# Patient Record
Sex: FEMALE | Race: WHITE | Employment: PART TIME | URBAN - METROPOLITAN AREA
[De-identification: names, ages, dates, MRNs, and addresses within clinical notes are randomized per-mention and may not be internally consistent; named-entity substitution may affect disease eponyms.]

---

## 2017-01-17 ENCOUNTER — OFFICE VISIT (OUTPATIENT)
Dept: DERMATOLOGY | Facility: CLINIC | Age: 32
End: 2017-01-17
Payer: COMMERCIAL

## 2017-01-17 VITALS
WEIGHT: 191.14 LBS | HEIGHT: 64 IN | BODY MASS INDEX: 32.63 KG/M2 | RESPIRATION RATE: 20 BRPM | HEART RATE: 60 BPM | SYSTOLIC BLOOD PRESSURE: 100 MMHG | OXYGEN SATURATION: 98 % | DIASTOLIC BLOOD PRESSURE: 50 MMHG

## 2017-01-17 DIAGNOSIS — D48.5 NEOPLASM OF UNCERTAIN BEHAVIOR OF SKIN: Primary | ICD-10-CM

## 2017-01-17 DIAGNOSIS — D23.4 BENIGN NEOPLASM OF SCALP AND SKIN OF NECK: ICD-10-CM

## 2017-01-17 PROCEDURE — 11100 HC BIOPSY SKIN/SUBQ/MUC MEM, SINGLE LESION: CPT | Performed by: DERMATOLOGY

## 2017-01-17 PROCEDURE — 88305 TISSUE EXAM BY PATHOLOGIST: CPT | Performed by: DERMATOLOGY

## 2017-01-17 PROCEDURE — 99202 OFFICE O/P NEW SF 15 MIN: CPT | Mod: 25 | Performed by: DERMATOLOGY

## 2017-01-17 RX ORDER — LIDOCAINE HYDROCHLORIDE AND EPINEPHRINE 10; 10 MG/ML; UG/ML
3 INJECTION, SOLUTION INFILTRATION; PERINEURAL ONCE
Qty: 3 ML | Refills: 0 | OUTPATIENT
Start: 2017-01-17 | End: 2017-01-17

## 2017-01-17 NOTE — NURSING NOTE
"Chief Complaint   Patient presents with     Consult     Wart on Right temple and Mole on back        Initial /50 mmHg  Pulse 60  Resp 20  Ht 5' 3.98\" (1.625 m)  Wt 191 lb 2.2 oz (86.7 kg)  BMI 32.83 kg/m2  SpO2 98% Estimated body mass index is 32.83 kg/(m^2) as calculated from the following:    Height as of this encounter: 5' 3.98\" (1.625 m).    Weight as of this encounter: 191 lb 2.2 oz (86.7 kg).  BP completed using cuff size: yolanda Sousa MA      "
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
## 2017-01-20 PROBLEM — D23.4 BENIGN NEOPLASM OF SCALP AND SKIN OF NECK: Status: ACTIVE | Noted: 2017-01-20

## 2017-01-20 PROBLEM — D48.5 NEOPLASM OF UNCERTAIN BEHAVIOR OF SKIN: Status: ACTIVE | Noted: 2017-01-20

## 2017-02-01 LAB — COPATH REPORT: NORMAL

## 2017-02-03 ENCOUNTER — TELEPHONE (OUTPATIENT)
Dept: DERMATOLOGY | Facility: CLINIC | Age: 32
End: 2017-02-03

## 2017-02-03 DIAGNOSIS — D09.9 SQUAMOUS CELL CARCINOMA IN SITU: Primary | ICD-10-CM

## 2017-02-03 RX ORDER — IMIQUIMOD 12.5 MG/.25G
CREAM TOPICAL
Qty: 24 PACKET | Refills: 1 | Status: SHIPPED | OUTPATIENT
Start: 2017-02-03

## 2017-02-03 NOTE — TELEPHONE ENCOUNTER
Called with results of biopsy showing atypical squamous proliferation. Recommended 6 weeks of aldara 5 times per week. Will ask that she return in 2 months for recheck of the area. Patient agrees with plan.

## 2017-04-04 ENCOUNTER — OFFICE VISIT (OUTPATIENT)
Dept: DERMATOLOGY | Facility: CLINIC | Age: 32
End: 2017-04-04
Payer: COMMERCIAL

## 2017-04-04 DIAGNOSIS — D49.2 ATYPICAL SQUAMOPROLIFERATIVE SKIN LESION: Primary | ICD-10-CM

## 2017-04-04 PROCEDURE — 99213 OFFICE O/P EST LOW 20 MIN: CPT | Performed by: DERMATOLOGY
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
## 2017-04-04 NOTE — MR AVS SNAPSHOT
"              After Visit Summary   2017    Lizzy Conrad    MRN: [id number]           Patient Information     Date Of Birth          1985        Visit Information        Provider Department      2017 12:45 PM Marisabel Cesar MD [facility]        Today's Diagnoses     Atypical squamoproliferative skin lesion    -  1       Follow-ups after your visit        Who to contact     If you have questions or need follow up information about today's clinic visit or your schedule please contact [facility] directly at [phone].  Normal or non-critical lab and imaging results will be communicated to you by mohchihart, letter or phone within 4 business days after the clinic has received the results. If you do not hear from us within 7 days, please contact the clinic through mohchihart or phone. If you have a critical or abnormal lab result, we will notify you by phone as soon as possible.  Submit refill requests through [facility] or call your pharmacy and they will forward the refill request to us. Please allow 3 business days for your refill to be completed.          Additional Information About Your Visit        MyChart Information     [facility] lets you send messages to your doctor, view your test results, renew your prescriptions, schedule appointments and more. To sign up, go to [email or web address] . Click on \"Log in\" on the left side of the screen, which will take you to the Welcome page. Then click on \"Sign up Now\" on the right side of the page.     You will be asked to enter the access code listed below, as well as some personal information. Please follow the directions to create your username and password.     Your access code is: [email or web address]  Expires: 7/3/2017  1:26 PM     Your access code will  in 90 days. If you need help or a new code, please call your [facility] or [phone].        Care EveryWhere ID     This is your Care EveryWhere ID. This could be used by " other organizations to access your [facility] medical records  [id number]         Blood Pressure from Last 3 Encounters:   01/17/17 100/50   07/22/14 104/76   05/31/13 106/70    Weight from Last 3 Encounters:   01/17/17 191 lb 2.2 oz (86.7 kg)   07/22/14 175 lb (79.4 kg)   05/30/13 130 lb 1.1 oz (59 kg)              Today, you had the following     No orders found for display       Primary Care Provider Office Phone # Fax #    Gisela Candelaria [phone]       [facility] [address]  [facility] [address]        Thank you!     Thank you for choosing [facility]  for your care. Our goal is always to provide you with excellent care. Hearing back from our patients is one way we can continue to improve our services. Please take a few minutes to complete the written survey that you may receive in the mail after your visit with us. Thank you!             Your Updated Medication List - Protect others around you: Learn how to safely use, store and throw away your medicines at www.disposemymeds.org.          This list is accurate as of: 4/4/17  1:26 PM.  Always use your most recent med list.                   Brand Name Dispense Instructions for use    * albuterol (2.5 MG/3ML) 0.083% neb solution      Take 1 ampule by nebulization every 6 hours as needed.       * albuterol 108 (90 BASE) MCG/ACT Inhaler   Generic drug:  albuterol      Inhale 2 puffs into the lungs every 6 hours.       calcium carb 1250 mg (500 mg [place])/vitamin D 200 units 500-200 MG-UNIT per tablet    OSCAL with D     Take 1 tablet by mouth daily.       fluticasone 110 MCG/ACT Inhaler    FLOVENT HFA     Take 1 puff by mouth 2 times daily.       imiquimod 5 % cream    ALDARA    24 packet    To biopsy site 5 nights weekly for 6 weeks.       loratadine-pseudoePHEDrine  MG per 24 hr tablet    CLARITIN-D 24-hour     Take 1 tablet by mouth daily.       montelukast 10 MG tablet    SINGULAIR    90 tablet     Take 1 tablet by mouth At Bedtime.       multivitamin, therapeutic Tabs tablet      Take 1 tablet by mouth daily.       OMEGA-3 FISH OIL PO      Take 1 g by mouth daily.       * Notice:  This list has 2 medication(s) that are the same as other medications prescribed for you. Read the directions carefully, and ask your doctor or other care provider to review them with you.

## 2017-04-04 NOTE — NURSING NOTE
"Chief Complaint   Patient presents with     RECHECK     2 month follow up for neoplasm on right temple healed very well no irritation        Initial There were no vitals taken for this visit. Estimated body mass index is 32.83 kg/(m^2) as calculated from the following:    Height as of 1/17/17: 5' 3.98\" (1.625 m).    Weight as of 1/17/17: 191 lb 2.2 oz (86.7 kg).  Medication Reconciliation: complete   Kylie Sousa MA      "
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
